# Patient Record
Sex: FEMALE | URBAN - METROPOLITAN AREA
[De-identification: names, ages, dates, MRNs, and addresses within clinical notes are randomized per-mention and may not be internally consistent; named-entity substitution may affect disease eponyms.]

---

## 2023-08-29 ENCOUNTER — OFFICE VISIT (OUTPATIENT)
Dept: URBAN - METROPOLITAN AREA CLINIC 113 | Facility: CLINIC | Age: 38
End: 2023-08-29

## 2023-11-15 ENCOUNTER — LAB OUTSIDE AN ENCOUNTER (OUTPATIENT)
Dept: URBAN - METROPOLITAN AREA CLINIC 107 | Facility: CLINIC | Age: 38
End: 2023-11-15

## 2023-11-15 ENCOUNTER — TELEPHONE ENCOUNTER (OUTPATIENT)
Dept: URBAN - METROPOLITAN AREA CLINIC 113 | Facility: CLINIC | Age: 38
End: 2023-11-15

## 2023-11-15 ENCOUNTER — OFFICE VISIT (OUTPATIENT)
Dept: URBAN - METROPOLITAN AREA CLINIC 107 | Facility: CLINIC | Age: 38
End: 2023-11-15
Payer: COMMERCIAL

## 2023-11-15 VITALS
TEMPERATURE: 96.6 F | HEIGHT: 63 IN | DIASTOLIC BLOOD PRESSURE: 67 MMHG | WEIGHT: 113.8 LBS | SYSTOLIC BLOOD PRESSURE: 98 MMHG | HEART RATE: 99 BPM | BODY MASS INDEX: 20.16 KG/M2

## 2023-11-15 DIAGNOSIS — K74.60 CIRRHOSIS: ICD-10-CM

## 2023-11-15 DIAGNOSIS — R19.7 DIARRHEA: ICD-10-CM

## 2023-11-15 DIAGNOSIS — R14.0 ABDOMINAL DISTENSION (GASEOUS): ICD-10-CM

## 2023-11-15 DIAGNOSIS — R11.2 NAUSEA AND VOMITING: ICD-10-CM

## 2023-11-15 DIAGNOSIS — R19.4 CHANGE IN BOWEL HABITS: ICD-10-CM

## 2023-11-15 DIAGNOSIS — K21.9 GERD: ICD-10-CM

## 2023-11-15 DIAGNOSIS — R18.8 OTHER ASCITES: ICD-10-CM

## 2023-11-15 PROCEDURE — 99204 OFFICE O/P NEW MOD 45 MIN: CPT | Performed by: NURSE PRACTITIONER

## 2023-11-15 RX ORDER — SPIRONOLACTONE 100 MG/1
1 TABLET TABLET, FILM COATED ORAL ONCE A DAY
Qty: 30 | Status: ACTIVE | COMMUNITY
Start: 2023-11-15 | End: 2023-12-15

## 2023-11-15 RX ORDER — ONDANSETRON 4 MG/1
1 TABLET ON THE TONGUE AND ALLOW TO DISSOLVE TABLET, ORALLY DISINTEGRATING ORAL ONCE A DAY
Status: ACTIVE | COMMUNITY

## 2023-11-15 RX ORDER — PANTOPRAZOLE SODIUM 40 MG/1
1 TABLET TABLET, DELAYED RELEASE ORAL ONCE A DAY
Qty: 30 | Status: ACTIVE | COMMUNITY
Start: 2023-11-15

## 2023-11-15 RX ORDER — PANTOPRAZOLE SODIUM 40 MG/1
1 TABLET TABLET, DELAYED RELEASE ORAL ONCE A DAY
Status: ACTIVE | COMMUNITY
Start: 2023-11-15

## 2023-11-15 RX ORDER — HYDROXYZINE HYDROCHLORIDE 25 MG/1
1 TABLET AS NEEDED TABLET, FILM COATED ORAL ONCE A DAY
Qty: 30 | Status: ACTIVE | COMMUNITY
Start: 2023-11-15

## 2023-11-15 RX ORDER — PANTOPRAZOLE SODIUM 40 MG/1
1 TABLET TABLET, DELAYED RELEASE ORAL ONCE A DAY
OUTPATIENT
Start: 2023-11-15
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
## 2023-12-07 ENCOUNTER — TELEPHONE ENCOUNTER (OUTPATIENT)
Dept: URBAN - METROPOLITAN AREA CLINIC 107 | Facility: CLINIC | Age: 38
End: 2023-12-07

## 2023-12-08 ENCOUNTER — LAB OUTSIDE AN ENCOUNTER (OUTPATIENT)
Dept: URBAN - METROPOLITAN AREA CLINIC 107 | Facility: CLINIC | Age: 38
End: 2023-12-08

## 2023-12-26 ENCOUNTER — TELEPHONE ENCOUNTER (OUTPATIENT)
Dept: URBAN - METROPOLITAN AREA CLINIC 107 | Facility: CLINIC | Age: 38
End: 2023-12-26

## 2024-01-02 ENCOUNTER — WEB ENCOUNTER (OUTPATIENT)
Dept: URBAN - METROPOLITAN AREA CLINIC 113 | Facility: CLINIC | Age: 39
End: 2024-01-02

## 2024-01-04 ENCOUNTER — OFFICE VISIT (OUTPATIENT)
Dept: URBAN - METROPOLITAN AREA CLINIC 113 | Facility: CLINIC | Age: 39
End: 2024-01-04

## 2024-01-10 ENCOUNTER — LAB OUTSIDE AN ENCOUNTER (OUTPATIENT)
Dept: URBAN - METROPOLITAN AREA CLINIC 113 | Facility: CLINIC | Age: 39
End: 2024-01-10

## 2024-01-10 ENCOUNTER — TELEPHONE ENCOUNTER (OUTPATIENT)
Dept: URBAN - METROPOLITAN AREA CLINIC 113 | Facility: CLINIC | Age: 39
End: 2024-01-10

## 2024-01-12 ENCOUNTER — OFFICE VISIT (OUTPATIENT)
Dept: URBAN - METROPOLITAN AREA CLINIC 113 | Facility: CLINIC | Age: 39
End: 2024-01-12
Payer: COMMERCIAL

## 2024-01-12 VITALS
RESPIRATION RATE: 18 BRPM | WEIGHT: 121 LBS | BODY MASS INDEX: 21.44 KG/M2 | HEIGHT: 63 IN | HEART RATE: 99 BPM | SYSTOLIC BLOOD PRESSURE: 123 MMHG | TEMPERATURE: 98 F | DIASTOLIC BLOOD PRESSURE: 73 MMHG

## 2024-01-12 DIAGNOSIS — K76.6 PORTAL HYPERTENSION: ICD-10-CM

## 2024-01-12 DIAGNOSIS — R18.8 OTHER ASCITES: ICD-10-CM

## 2024-01-12 DIAGNOSIS — K74.60 CIRRHOSIS: ICD-10-CM

## 2024-01-12 PROCEDURE — 99214 OFFICE O/P EST MOD 30 MIN: CPT | Performed by: NURSE PRACTITIONER

## 2024-01-12 RX ORDER — PANTOPRAZOLE SODIUM 40 MG/1
1 TABLET TABLET, DELAYED RELEASE ORAL ONCE A DAY
Status: ACTIVE | COMMUNITY
Start: 2023-11-15

## 2024-01-12 RX ORDER — HYDROXYZINE HYDROCHLORIDE 25 MG/1
1 TABLET AS NEEDED TABLET, FILM COATED ORAL ONCE A DAY
Qty: 30 | Status: ACTIVE | COMMUNITY
Start: 2023-11-15

## 2024-01-12 RX ORDER — FUROSEMIDE 40 MG/1
1 TABLET TABLET ORAL ONCE A DAY
Status: ACTIVE | COMMUNITY

## 2024-01-12 RX ORDER — SPIRONOLACTONE 50 MG/1
1 TABLET TABLET, FILM COATED ORAL ONCE A DAY
Status: ACTIVE | COMMUNITY

## 2024-01-12 RX ORDER — MELOXICAM 7.5 MG
1 TABLET TABLET ORAL ONCE A DAY
Status: ACTIVE | COMMUNITY

## 2024-01-12 RX ORDER — ONDANSETRON 4 MG/1
1 TABLET ON THE TONGUE AND ALLOW TO DISSOLVE TABLET, ORALLY DISINTEGRATING ORAL ONCE A DAY
Status: ACTIVE | COMMUNITY

## 2024-01-12 RX ORDER — SPIRONOLACTONE 100 MG/1
1 TABLET TABLET, FILM COATED ORAL ONCE A DAY
Status: ACTIVE | COMMUNITY

## 2024-01-12 NOTE — HPI-OTHER HISTORIES
She supplies previous records- EGD 2/3/2023 by Dr. Keven joel:A Schatzki's ring was found in the lower third of the esophagus, small sliding hiatal hernia, patchy candidiasis seen in the esophagus, localized irregular margins and erythema in the gastroesophageal junction, congestion and erythema of the mucosa with stigmata of recent bleeding noted in the stomach, abnormal vascularity of the mucosa noted in the stomach consistent with gastritis and portal hypertensive gastropathy, congestion and erythema of mucosa noted in the duodenum. Multiple biopsies were taken, however, pathology is not available for review. Colonoscopy 2/3/2023 by Dr. Keven joel in [place] :Moderate sigmoid colon diverticulosis, internal and external hemorrhoids, a 12 mm polyp in the transverse colon, a 2 mm polyp in the ascending colon, otherwise normal colonic mucosa status post random biopsies. A repeat colonoscopy is recommended in 3 years.

## 2024-01-12 NOTE — HPI-TODAY'S VISIT:
38-year-old female with a history of autism and ADHD presenting for follow-up of cirrhosis.  She was seen in the office in November to establish care regarding decompensated cirrhosis complicated by portal hypertension and ascites, suspected to be secondary to alcohol. Labs were planned to exclude secondary cause of liver disease and an abdominal ultrasound was planned for HCC screening. Prior EGD showed evidence for portal hypertension but was negative for esophageal varices. A repeat EGD was recommended in Feb 2024 for variceal surveillance. She was to continue spironolactone and encouraged ongoing alcohol avoidance. Therapeutic paracentesis was arranged. Regarding her recent exacerbation of nausea, vomiting and diarrhea with ongoing abdominal distention, an abdominal xray was planned to exclude an obstructive process. Abdominal x-ray 12/27/2023:Nonspecific mildly prominent loops in the upper central abdomen with scattered gas seen distally within the colon or rectum, partial obstruction not excluded.  Platelike scarring and atelectasis in the right lung base with possible component of calcified pleural plaque in this region. Ultrasound-guided paracentesis 12/27/2023:8.4cc removed. ER visit for ascites 12/12/2023.  Ultrasound-guided paracentesis performed with 8.5 L removed.  Labs at that time show H/H10.3/29.8, MCV 94.9, , WBC 6.6.  , ALT 44, .01, T. bili 1.2, CMP otherwise unremarkable aside from sodium 129.28, potassium 2.9.  Lipase 137.  Ammonia 16.68. Ultrasound-guided paracentesis 11/16/2023:8 L removed.  Fluid analysis performed, however, results are not available within the chart.  She did not have the labs performed as ordered last visit to evaluate for other causes of liver disease. Overall, she is feeling better following multiple paracenteses. She has another scheduled on Monday. Her energy has improved, as has her appetite. Her abdominal discomfort and nausea have subsided. She has occasional exacerbations of diarrhea just prior to undergoing each paracentesis, but otherwise bowel habits are normal. Her PCP increased her spironolactone to 150mg daily. She is also taking furosemide 40mg daily.

## 2024-01-18 ENCOUNTER — LAB OUTSIDE AN ENCOUNTER (OUTPATIENT)
Dept: URBAN - METROPOLITAN AREA CLINIC 113 | Facility: CLINIC | Age: 39
End: 2024-01-18

## 2024-01-18 ENCOUNTER — OFFICE VISIT (OUTPATIENT)
Dept: URBAN - METROPOLITAN AREA CLINIC 113 | Facility: CLINIC | Age: 39
End: 2024-01-18
Payer: COMMERCIAL

## 2024-01-18 ENCOUNTER — DASHBOARD ENCOUNTERS (OUTPATIENT)
Age: 39
End: 2024-01-18

## 2024-01-18 VITALS
BODY MASS INDEX: 19.91 KG/M2 | RESPIRATION RATE: 18 BRPM | DIASTOLIC BLOOD PRESSURE: 55 MMHG | SYSTOLIC BLOOD PRESSURE: 93 MMHG | TEMPERATURE: 96.9 F | WEIGHT: 112.4 LBS | HEART RATE: 86 BPM | HEIGHT: 63 IN

## 2024-01-18 DIAGNOSIS — K74.60 CIRRHOSIS: ICD-10-CM

## 2024-01-18 PROCEDURE — 99214 OFFICE O/P EST MOD 30 MIN: CPT | Performed by: STUDENT IN AN ORGANIZED HEALTH CARE EDUCATION/TRAINING PROGRAM

## 2024-01-18 RX ORDER — FUROSEMIDE 40 MG/1
1 TABLET TABLET ORAL ONCE A DAY
Status: ACTIVE | COMMUNITY

## 2024-01-18 RX ORDER — PANTOPRAZOLE SODIUM 40 MG/1
1 TABLET TABLET, DELAYED RELEASE ORAL ONCE A DAY
Status: ACTIVE | COMMUNITY
Start: 2023-11-15

## 2024-01-18 RX ORDER — SPIRONOLACTONE 50 MG/1
1 TABLET TABLET, FILM COATED ORAL ONCE A DAY
Status: ACTIVE | COMMUNITY

## 2024-01-18 RX ORDER — SPIRONOLACTONE 100 MG/1
1 TABLET TABLET, FILM COATED ORAL ONCE A DAY
Status: ACTIVE | COMMUNITY

## 2024-01-18 RX ORDER — HYDROXYZINE HYDROCHLORIDE 25 MG/1
1 TABLET AS NEEDED TABLET, FILM COATED ORAL ONCE A DAY
Qty: 30 | Status: ACTIVE | COMMUNITY
Start: 2023-11-15

## 2024-01-18 RX ORDER — ONDANSETRON 4 MG/1
1 TABLET ON THE TONGUE AND ALLOW TO DISSOLVE TABLET, ORALLY DISINTEGRATING ORAL ONCE A DAY
Status: ACTIVE | COMMUNITY

## 2024-01-18 RX ORDER — MELOXICAM 7.5 MG
1 TABLET TABLET ORAL ONCE A DAY
Status: ACTIVE | COMMUNITY

## 2024-01-18 NOTE — HPI-TODAY'S VISIT:
Initial visit 11/15/2023Seen by Libby Sanchez NP. 38-year-old female with a history of autism and ADHD presenting for follow-up of cirrhosis. She was seen in the office in November to establish care regarding decompensated cirrhosis complicated by portal hypertension and ascites, suspected to be secondary to alcohol. Labs were planned to exclude secondary cause of liver disease and an abdominal ultrasound was planned for HCC screening. Prior EGD showed evidence for portal hypertension but was negative for esophageal varices. A repeat EGD was recommended in Feb 2024 for variceal surveillance. She was to continue spironolactone and encouraged ongoing alcohol avoidance. Therapeutic paracentesis was arranged. Regarding her recent exacerbation of nausea, vomiting and diarrhea with ongoing abdominal distention, an abdominal xray was planned to exclude an obstructive process. Abdominal x-ray 12/27/2023:Nonspecific mildly prominent loops in the upper central abdomen with scattered gas seen distally within the colon or rectum, partial obstruction not excluded.  Platelike scarring and atelectasis in the right lung base with possible component of calcified pleural plaque in this region. Ultrasound-guided paracentesis 12/27/2023:8.4cc removed. ER visit for ascites 12/12/2023.  Ultrasound-guided paracentesis performed with 8.5 L removed.  Labs at that time show H/H10.3/29.8, MCV 94.9, , WBC 6.6.  , ALT 44, .01, T. bili 1.2, CMP otherwise unremarkable aside from sodium 129.28, potassium 2.9.  Lipase 137.  Ammonia 16.68. Ultrasound-guided paracentesis 11/16/2023:8 L removed.  Fluid analysis performed, however, results are not available within the chart. . Follow-up visit on 1/12/24; seen by Libby Sanchez NP She did not have the labs performed as ordered last visit to evaluate for other causes of liver disease. Overall, she is feeling better following multiple paracenteses. She has another scheduled on Monday. Her energy has improved, as has her appetite. Her abdominal discomfort and nausea have subsided. She has occasional exacerbations of diarrhea just prior to undergoing each paracentesis, but otherwise bowel habits are normal. Her PCP increased her spironolactone to 150mg daily. She is also taking furosemide 40mg daily. . Follow-up visit 1/18/2024 Paracentesis 1/15/2024: Removal of 7 L of straw-colored fluidLabs 1/15/2024: WBC 5.4, hemoglobin 9.5, MCV 93.3, platelet 116, sodium 134, potassium 3.2, BUN 10, creatinine 0.67, albumin 3.0, total bilirubin 1.3, AST 59, , ALT 30, ferritin 62, INR 1.1, MILAGRO negative, hepatitis B surface antigen negative, hepatitis B antibody reactive, hepatitis C antibody negative, hepatitis A antibody positive, smooth muscle antibody mildly elevated to 20, antimitochondrial antibody negative. Her main issue is abdominal swelling. She admits to alcohol use in the past, none since 07/2023. Area L Indication Text: Tumors in this location are included in Area L (trunk and extremities).  Mohs surgery is indicated for larger tumors, or tumors with aggressive histologic features, in these anatomic locations.  It also meets appropriate use criteria (AUC) for Mohs surgery.

## 2024-01-22 ENCOUNTER — LAB OUTSIDE AN ENCOUNTER (OUTPATIENT)
Dept: URBAN - METROPOLITAN AREA CLINIC 113 | Facility: CLINIC | Age: 39
End: 2024-01-22

## 2024-02-07 ENCOUNTER — OV EP (OUTPATIENT)
Dept: URBAN - METROPOLITAN AREA CLINIC 107 | Facility: CLINIC | Age: 39
End: 2024-02-07

## 2024-02-07 RX ORDER — SPIRONOLACTONE 50 MG/1
1 TABLET TABLET, FILM COATED ORAL ONCE A DAY
Status: ACTIVE | COMMUNITY

## 2024-02-07 RX ORDER — SPIRONOLACTONE 100 MG/1
1 TABLET TABLET, FILM COATED ORAL ONCE A DAY
Status: ACTIVE | COMMUNITY

## 2024-02-07 RX ORDER — MELOXICAM 7.5 MG
1 TABLET TABLET ORAL ONCE A DAY
Status: ACTIVE | COMMUNITY

## 2024-02-07 RX ORDER — FUROSEMIDE 40 MG/1
1 TABLET TABLET ORAL ONCE A DAY
Status: ACTIVE | COMMUNITY

## 2024-02-07 RX ORDER — HYDROXYZINE HYDROCHLORIDE 25 MG/1
1 TABLET AS NEEDED TABLET, FILM COATED ORAL ONCE A DAY
Qty: 30 | Status: ACTIVE | COMMUNITY
Start: 2023-11-15

## 2024-02-07 RX ORDER — PANTOPRAZOLE SODIUM 40 MG/1
1 TABLET TABLET, DELAYED RELEASE ORAL ONCE A DAY
Status: ACTIVE | COMMUNITY
Start: 2023-11-15

## 2024-02-07 RX ORDER — ONDANSETRON 4 MG/1
1 TABLET ON THE TONGUE AND ALLOW TO DISSOLVE TABLET, ORALLY DISINTEGRATING ORAL ONCE A DAY
Status: ACTIVE | COMMUNITY

## 2024-02-07 NOTE — HPI-TODAY'S VISIT:
Initial visit 11/15/2023Seen by Libby Sanchez NP. 38-year-old female with a history of autism and ADHD presenting for follow-up of cirrhosis. She was seen in the office in November to establish care regarding decompensated cirrhosis complicated by portal hypertension and ascites, suspected to be secondary to alcohol. Labs were planned to exclude secondary cause of liver disease and an abdominal ultrasound was planned for HCC screening. Prior EGD showed evidence for portal hypertension but was negative for esophageal varices. A repeat EGD was recommended in Feb 2024 for variceal surveillance. She was to continue spironolactone and encouraged ongoing alcohol avoidance. Therapeutic paracentesis was arranged. Regarding her recent exacerbation of nausea, vomiting and diarrhea with ongoing abdominal distention, an abdominal xray was planned to exclude an obstructive process. Abdominal x-ray 12/27/2023:Nonspecific mildly prominent loops in the upper central abdomen with scattered gas seen distally within the colon or rectum, partial obstruction not excluded.  Platelike scarring and atelectasis in the right lung base with possible component of calcified pleural plaque in this region. Ultrasound-guided paracentesis 12/27/2023:8.4cc removed. ER visit for ascites 12/12/2023.  Ultrasound-guided paracentesis performed with 8.5 L removed.  Labs at that time show H/H10.3/29.8, MCV 94.9, , WBC 6.6.  , ALT 44, .01, T. bili 1.2, CMP otherwise unremarkable aside from sodium 129.28, potassium 2.9.  Lipase 137.  Ammonia 16.68. Ultrasound-guided paracentesis 11/16/2023:8 L removed.  Fluid analysis performed, however, results are not available within the chart. . Follow-up visit on 1/12/24; seen by Libby Sanchez NP She did not have the labs performed as ordered last visit to evaluate for other causes of liver disease. Overall, she is feeling better following multiple paracenteses. She has another scheduled on Monday. Her energy has improved, as has her appetite. Her abdominal discomfort and nausea have subsided. She has occasional exacerbations of diarrhea just prior to undergoing each paracentesis, but otherwise bowel habits are normal. Her PCP increased her spironolactone to 150mg daily. She is also taking furosemide 40mg daily. . Follow-up visit 1/18/2024 Paracentesis 1/15/2024: Removal of 7 L of straw-colored fluidLabs 1/15/2024: WBC 5.4, hemoglobin 9.5, MCV 93.3, platelet 116, sodium 134, potassium 3.2, BUN 10, creatinine 0.67, albumin 3.0, total bilirubin 1.3, AST 59, , ALT 30, ferritin 62, INR 1.1, MILAGRO negative, hepatitis B surface antigen negative, hepatitis B antibody reactive, hepatitis C antibody negative, hepatitis A antibody positive, smooth muscle antibody mildly elevated to 20, antimitochondrial antibody negative. Her main issue is abdominal swelling. She admits to alcohol use in the past, none since 07/2023. . Follow-up visit 2724Labs 1/29/2024: INR 1.15, platelet 103Paracentesis 1/29/2024: 4.3 L removed.

## 2024-02-14 ENCOUNTER — OV EP (OUTPATIENT)
Dept: URBAN - METROPOLITAN AREA CLINIC 113 | Facility: CLINIC | Age: 39
End: 2024-02-14

## 2024-02-28 PROBLEM — 420054005: Status: ACTIVE | Noted: 2024-02-28

## 2024-03-01 ENCOUNTER — EGD (OUTPATIENT)
Dept: URBAN - METROPOLITAN AREA MEDICAL CENTER 2 | Facility: MEDICAL CENTER | Age: 39
End: 2024-03-01